# Patient Record
Sex: FEMALE | Race: WHITE | URBAN - METROPOLITAN AREA
[De-identification: names, ages, dates, MRNs, and addresses within clinical notes are randomized per-mention and may not be internally consistent; named-entity substitution may affect disease eponyms.]

---

## 2022-10-28 ENCOUNTER — OFFICE VISIT (OUTPATIENT)
Dept: URBAN - METROPOLITAN AREA CLINIC 88 | Facility: CLINIC | Age: 22
End: 2022-10-28
Payer: COMMERCIAL

## 2022-10-28 DIAGNOSIS — H35.411 LATTICE DEGENERATION OF RETINA, RIGHT EYE: ICD-10-CM

## 2022-10-28 DIAGNOSIS — H52.13 MYOPIA, BILATERAL: Primary | ICD-10-CM

## 2022-10-28 PROCEDURE — 92310 CONTACT LENS FITTING OU: CPT | Performed by: OPTOMETRIST

## 2022-10-28 PROCEDURE — 92004 COMPRE OPH EXAM NEW PT 1/>: CPT | Performed by: OPTOMETRIST

## 2022-10-28 ASSESSMENT — KERATOMETRY
OS: 42.00
OD: 42.00

## 2022-10-28 ASSESSMENT — VISUAL ACUITY: OD: 20/20

## 2022-10-28 ASSESSMENT — INTRAOCULAR PRESSURE
OS: 12
OD: 12

## 2022-10-28 NOTE — IMPRESSION/PLAN
Impression: Lattice degeneration of retina, right eye: H35.411. Plan: A thorough review of the ocular findings was discussed. Signs and symptoms of retinal detachment (new flashes, floaters, or peripheral vision changes) reviewed in detail. Patient knows to RTC immediately if symptoms are experienced.